# Patient Record
Sex: MALE | Race: WHITE
[De-identification: names, ages, dates, MRNs, and addresses within clinical notes are randomized per-mention and may not be internally consistent; named-entity substitution may affect disease eponyms.]

---

## 2019-09-18 ENCOUNTER — HOSPITAL ENCOUNTER (OUTPATIENT)
Dept: HOSPITAL 92 - SCSMRI | Age: 39
Discharge: HOME | End: 2019-09-18
Attending: PHYSICAL MEDICINE & REHABILITATION
Payer: COMMERCIAL

## 2019-09-18 DIAGNOSIS — M54.6: Primary | ICD-10-CM

## 2019-09-18 PROCEDURE — 72146 MRI CHEST SPINE W/O DYE: CPT

## 2019-09-18 NOTE — MRI
EXAM: MRI thoracic spine without contrast



HISTORY: Pain between the scapula for 11 years



COMPARISON: None



TECHNIQUE: Multiplanar multisequence MR images were obtained of the thoracic spine without contrast.



FINDINGS:

The vertebral bodies and intervertebral discs demonstrate normal height and alignment without fractur
e or subluxation.

The visualized cord demonstrates normal signal throughout.  

The prevertebral soft tissues are unremarkable.  No paraspinal soft tissue abnormality is seen.



T1/2: No significant posterior bulge or protrusion.  No posterior facet arthrosis.  No central canal 
stenosis.  No neural foraminal stenosis.



T2/3: No significant posterior bulge or protrusion.  No posterior facet arthrosis.  No central canal 
stenosis.  No neural foraminal stenosis.



T3/4: No significant posterior bulge or protrusion.  No posterior facet arthrosis.  No central canal 
stenosis.  No neural foraminal stenosis.



T4/5: No significant posterior bulge or protrusion.  No posterior facet arthrosis.  No central canal 
stenosis.  No neural foraminal stenosis.



T5/6: No significant posterior bulge or protrusion.  No posterior facet arthrosis.  No central canal 
stenosis.  No neural foraminal stenosis.



T6/7: No significant posterior bulge or protrusion.  No posterior facet arthrosis.  No central canal 
stenosis.  No neural foraminal stenosis.



T7/8: No significant posterior bulge or protrusion.  No posterior facet arthrosis.  No central canal 
stenosis.  No neural foraminal stenosis.



T8/9: No significant posterior bulge or protrusion.  No posterior facet arthrosis.  No central canal 
stenosis.  No neural foraminal stenosis.



T9/10: No significant posterior bulge or protrusion.  No posterior facet arthrosis.  No central canal
 stenosis.  No neural foraminal stenosis.



T10/11: No significant posterior bulge or protrusion.  No posterior facet arthrosis.  No central aster
l stenosis.  No neural foraminal stenosis.



T11/12: No significant posterior bulge or protrusion.  No posterior facet arthrosis.  No central aster
l stenosis.  No neural foraminal stenosis.



T12/L1: No significant posterior bulge or protrusion.  No posterior facet arthrosis.  No central aster
l stenosis.  No neural foraminal stenosis.



IMPRESSION: 

No significant thoracic abnormality.



Reported By: Damon Muniz 

Electronically Signed:  9/18/2019 6:35 PM

## 2019-11-19 ENCOUNTER — HOSPITAL ENCOUNTER (OUTPATIENT)
Dept: HOSPITAL 92 - BICRAD | Age: 39
Discharge: HOME | End: 2019-11-19
Attending: NURSE PRACTITIONER
Payer: COMMERCIAL

## 2019-11-19 DIAGNOSIS — M54.81: Primary | ICD-10-CM

## 2019-11-19 DIAGNOSIS — M40.50: ICD-10-CM

## 2019-11-19 PROCEDURE — 72050 X-RAY EXAM NECK SPINE 4/5VWS: CPT

## 2019-11-19 NOTE — RAD
Exam: 6 views cervical spine



HISTORY: Frequent migraines. History of thoracic disc herniation. Bilateral occipital neuralgia



FINDINGS:

There is no prevertebral soft tissue swelling

Predental space is normal

In the lateral neutral position, adequate evaluation of the cervical spine from C1 through C3 C6-C7 d
isc space. Suboptimal evaluation of the C7 vertebral body the cervicothoracic junction. In this

lateral neutral position, there is straightening of normal cervical lordosis. No abnormal motion upon
 flexion or extension.

On the AP projection, no malalignment.

Despite providing a swimmer's view, Limited evaluation of the C7 vertebral body of the cervicothoraci
c junction

On the open-mouth projection, lateral masses of C1 and C2 articulate appropriately. The tip of the od
ontoid process is obscured.



IMPRESSION: Nonspecific straightening of normal cervical lordosis which may be due to patient positio
n or muscle spasm. No symmetric and loss of disc space height. No evidence of fractures or

malalignment.



Transcribed Date/Time: 11/19/2019 5:19 PM



Reported By: Nathan Prater 

Electronically Signed:  11/20/2019 9:03 AM